# Patient Record
Sex: FEMALE | Race: AMERICAN INDIAN OR ALASKA NATIVE
[De-identification: names, ages, dates, MRNs, and addresses within clinical notes are randomized per-mention and may not be internally consistent; named-entity substitution may affect disease eponyms.]

---

## 2018-01-21 ENCOUNTER — HOSPITAL ENCOUNTER (EMERGENCY)
Dept: HOSPITAL 31 - C.ER | Age: 83
Discharge: HOME | End: 2018-01-21
Payer: MEDICARE

## 2018-01-21 VITALS
TEMPERATURE: 97.9 F | RESPIRATION RATE: 20 BRPM | DIASTOLIC BLOOD PRESSURE: 74 MMHG | OXYGEN SATURATION: 95 % | SYSTOLIC BLOOD PRESSURE: 136 MMHG | HEART RATE: 66 BPM

## 2018-01-21 VITALS — BODY MASS INDEX: 28.3 KG/M2

## 2018-01-21 DIAGNOSIS — M54.5: Primary | ICD-10-CM

## 2018-01-21 LAB
BACTERIA #/AREA URNS HPF: (no result) /[HPF]
BASOPHILS # BLD AUTO: 0.1 K/UL (ref 0–0.2)
BASOPHILS NFR BLD: 1.2 % (ref 0–2)
BILIRUB UR-MCNC: NEGATIVE MG/DL
BUN SERPL-MCNC: 21 MG/DL (ref 7–17)
CALCIUM SERPL-MCNC: 9.1 MG/DL (ref 8.6–10.4)
EOSINOPHIL # BLD AUTO: 0.2 K/UL (ref 0–0.7)
EOSINOPHIL NFR BLD: 2.3 % (ref 0–4)
ERYTHROCYTE [DISTWIDTH] IN BLOOD BY AUTOMATED COUNT: 15.5 % (ref 11.5–14.5)
GFR NON-AFRICAN AMERICAN: 36
GLUCOSE UR STRIP-MCNC: NORMAL MG/DL
HGB BLD-MCNC: 13.2 G/DL (ref 11–16)
HYALINE CASTS #/AREA URNS LPF: (no result) /LPF (ref 0–2)
LEUKOCYTE ESTERASE UR-ACNC: (no result) LEU/UL
LYMPHOCYTES # BLD AUTO: 2.6 K/UL (ref 1–4.3)
LYMPHOCYTES NFR BLD AUTO: 26.5 % (ref 20–40)
MCH RBC QN AUTO: 28.1 PG (ref 27–31)
MCHC RBC AUTO-ENTMCNC: 34.2 G/DL (ref 33–37)
MCV RBC AUTO: 82.1 FL (ref 81–99)
MONOCYTES # BLD: 0.6 K/UL (ref 0–0.8)
MONOCYTES NFR BLD: 5.9 % (ref 0–10)
NEUTROPHILS # BLD: 6.4 K/UL (ref 1.8–7)
NEUTROPHILS NFR BLD AUTO: 64.1 % (ref 50–75)
NRBC BLD AUTO-RTO: 0.2 % (ref 0–2)
PH UR STRIP: 5 [PH] (ref 5–8)
PLATELET # BLD: 432 K/UL (ref 130–400)
PMV BLD AUTO: 8.2 FL (ref 7.2–11.7)
PROT UR STRIP-MCNC: (no result) MG/DL
RBC # BLD AUTO: 4.71 MIL/UL (ref 3.8–5.2)
RBC # UR STRIP: NEGATIVE /UL
SP GR UR STRIP: 1.01 (ref 1–1.03)
SQUAMOUS EPITHIAL: 6 /HPF (ref 0–5)
URINE NITRATE: NEGATIVE
UROBILINOGEN UR-MCNC: NORMAL MG/DL (ref 0.2–1)
WBC # BLD AUTO: 9.9 K/UL (ref 4.8–10.8)

## 2018-01-21 NOTE — C.PDOC
History Of Present Illness


82-YEAR-OLD FEMALE, PRESENTS TO THE EMERGENCY DEPARTMENT WITH COMPLAINTS OF NEW 

ONSET RIGHT-LOWER BACK PAIN STARTED AT 04:00 THIS MORNING. PAIN IS LOCALIZED 

AND INTERMITTENT IN NATURE, PRESENT ONLY W MOVEMENT. PT TOOK TYLENOL AT 04:00 

THIS MORNING W LIMITED IMPROV


DENIES HX OF CHRONIC BP, TRAUMA, NO FEVER OR UTI SX.





EXAM


MILD DISC


NO C/T/L SPINE TEN


SPASM R-LOWER BACK, LOCAL AND REPROD PAIN


Time Seen by Provider: 18 11:05


Chief Complaint (Nursing): Back Pain


History Per: Patient


History/Exam Limitations: no limitations


Onset/Duration Of Symptoms: Days


Current Symptoms Are (Timing): Still Present





Past Medical History


Reviewed: Historical Data, Nursing Documentation, Vital Signs


Vital Signs: 


 Last Vital Signs











Temp  98.5 F   18 10:52


 


Pulse  78   18 13:13


 


Resp  18   18 13:13


 


BP  141/85   18 13:13


 


Pulse Ox  96   18 13:18














- Medical History


PMH: HTN, Hypercholesterolemia


Family History: States: No Known Family Hx





- Social History


Hx Alcohol Use: No


Hx Substance Use: No





- Immunization History


Hx Tetanus Toxoid Vaccination: No


Hx Influenza Vaccination: No


Hx Pneumococcal Vaccination: Yes





Review Of Systems


Except As Marked, All Systems Reviewed And Found Negative.


Constitutional: Negative for: Fever, Chills


Cardiovascular: Negative for: Chest Pain, Palpitations


Respiratory: Negative for: Shortness of Breath


Gastrointestinal: Negative for: Nausea, Vomiting


Musculoskeletal: Positive for: Back Pain


Neurological: Negative for: Weakness, Numbness





Physical Exam





- Physical Exam


Appears: Non-toxic, No Acute Distress


Skin: Warm, Dry, No Rash


Head: Atraumatic, Normacephalic


Eye(s): bilateral: Normal Inspection, PERRL


Nose: Normal


Oral Mucosa: Moist


Lips: Normal Appearing


Neck: Normal ROM


Cardiovascular: Rhythm Regular, No Murmur


Respiratory: Normal Breath Sounds, No Accessory Muscle Use


Back: No Vertebral Tenderness (NO THORACIC/LUMBAR/CERVICAL SPINE TENDERNESS), 

Muscle Spasm (R LOWER BACK TENDERNESS, LOCALIZED AND REPRODUCIBLE ), No 

Paraspinal Tenderness


Extremity: Normal ROM


Neurological/Psych: Oriented x3, Normal Speech





ED Course And Treatment





- Laboratory Results


Result Diagrams: 


 18 12:14





 18 12:14


ECG: Interpreted By Me, Viewed By Me


ECG Rhythm: Sinus Rhythm


ECG Interpretation: No Acute Changes


Rate From EC


O2 Sat by Pulse Oximetry: 96 (RA)


Pulse Ox Interpretation: Normal





Progress





- Re-Evaluation


Re-evaluation Note: 





18 13:14


PS FEELS BETTER W LIDODERM. PENDING UA. DOES NOT WISH IV. 


18 14:29


CHARLES NEG. APPEARS COMFORTABLE NAD. DC HOME





- Data Reviewed


Data Reviewed: Lab, Diagnostic imaging





Disposition


Counseled Patient/Family Regarding: Studies Performed, Diagnosis, Need For 

Followup, Rx Given





- Disposition


Referrals: 


YOUR,PMD [Other]


Disposition: HOME/ ROUTINE


Disposition Time: 14:28


Condition: IMPROVED


Prescriptions: 


Cyclobenzaprine [Flexeril] 10 mg PO TID #15 tab


Lidocaine 5% [Lidoderm] 1 ea TD PRN PRN #10 patch


 PRN Reason: Pain, Moderate (4-7)


Naproxen [Naprosyn] 1 tab PO BID PRN #25 tab


 PRN Reason: Pain


Instructions:  Acute Low Back Pain (ED)


Forms:  [facility] (English)





- Clinical Impression


Clinical Impression: 


 Low back pain








- Scribe Statement


The provider has reviewed the documentation as recorded by the Scribe (Maritza Echavarria)








All medical record entries made by the Scribe were at my direction and 

personally dictated by me. I have reviewed the chart and agree that the record 

accurately reflects my personal performance of the history, physical exam, 

medical decision making, and the department course for this patient. I have 

also personally directed, reviewed, and agree with the discharge instructions 

and disposition.

## 2018-01-21 NOTE — RAD
PROCEDURE:  Radiographs of the Lumbar Spine.



HISTORY:

R LOWER BACK PAIN







COMPARISON:

No prior.



FINDINGS:



BONES:

Vertebral bodies maintained in height. Grade 1 anterolisthesis at 

L4-5. Transverse processes and posterior elements appear intact.



DISC SPACES:

Narrowing of L4-5 intervertebral disc space consistent with 

degenerative disc disease. The remaining intervertebral disc spaces 

are maintained in height.



OTHER FINDINGS:

None.



IMPRESSION:

Degenerative disc disease with grade 1 anterolisthesis at L4-5.  

Otherwise unremarkable.